# Patient Record
Sex: FEMALE | Race: BLACK OR AFRICAN AMERICAN | NOT HISPANIC OR LATINO | Employment: FULL TIME | URBAN - METROPOLITAN AREA
[De-identification: names, ages, dates, MRNs, and addresses within clinical notes are randomized per-mention and may not be internally consistent; named-entity substitution may affect disease eponyms.]

---

## 2018-04-17 ENCOUNTER — APPOINTMENT (EMERGENCY)
Dept: RADIOLOGY | Facility: HOSPITAL | Age: 43
End: 2018-04-17

## 2018-04-17 ENCOUNTER — HOSPITAL ENCOUNTER (EMERGENCY)
Facility: HOSPITAL | Age: 43
Discharge: HOME | End: 2018-04-17
Attending: EMERGENCY MEDICINE | Admitting: EMERGENCY MEDICINE

## 2018-04-17 VITALS
DIASTOLIC BLOOD PRESSURE: 73 MMHG | WEIGHT: 179.68 LBS | SYSTOLIC BLOOD PRESSURE: 137 MMHG | HEART RATE: 71 BPM | OXYGEN SATURATION: 99 % | TEMPERATURE: 98.3 F | RESPIRATION RATE: 16 BRPM

## 2018-04-17 DIAGNOSIS — J40 BRONCHITIS: Primary | ICD-10-CM

## 2018-04-17 PROCEDURE — 71046 X-RAY EXAM CHEST 2 VIEWS: CPT

## 2018-04-17 PROCEDURE — 99283 EMERGENCY DEPT VISIT LOW MDM: CPT

## 2018-04-17 RX ORDER — ALBUTEROL SULFATE 90 UG/1
2 AEROSOL, METERED RESPIRATORY (INHALATION) EVERY 4 HOURS PRN
Qty: 1 INHALER | Refills: 0 | Status: SHIPPED | OUTPATIENT
Start: 2018-04-17

## 2018-04-17 RX ORDER — BENZONATATE 100 MG/1
100 CAPSULE ORAL EVERY 8 HOURS
Qty: 21 CAPSULE | Refills: 0 | Status: SHIPPED | OUTPATIENT
Start: 2018-04-17 | End: 2020-11-19

## 2018-04-18 NOTE — DISCHARGE INSTRUCTIONS
Use albuterol inhaler 2 puffs every 4-6 hours as needed for wheezing and shortness of breath for the next 2 days, then as needed afterwards  Use Tessalon Perles as needed for cough  Use cepacol throat drops as needed for sore throat  Follow-up with her primary care doctor in 5-7 days for re-evaluation  Return for fevers, chills, chest pain, shortness of breath, or any other concerns  Acute Bronchitis   WHAT YOU NEED TO KNOW:   Acute bronchitis is swelling and irritation in the air passages of your lungs  This irritation may cause you to cough or have other breathing problems  Acute bronchitis often starts because of another illness, such as a cold or the flu  The illness spreads from your nose and throat to your windpipe and airways  Bronchitis is often called a chest cold  Acute bronchitis lasts about 3 to 6 weeks and is usually not a serious illness  Your cough can last for several weeks  DISCHARGE INSTRUCTIONS:   Return to the emergency department if:   · You cough up blood  · Your lips or fingernails turn blue  · You feel like you are not getting enough air when you breathe  Contact your healthcare provider if:   · You have a fever  · Your breathing problems do not go away or get worse  · Your cough does not get better within 4 weeks  · You have questions or concerns about your condition or care  Self-care:   · Get more rest   Rest helps your body to heal  Slowly start to do more each day  Rest when you feel it is needed  · Avoid irritants in the air  Avoid chemicals, fumes, and dust  Wear a face mask if you must work around dust or fumes  Stay inside on days when air pollution levels are high  If you have allergies, stay inside when pollen counts are high  Do not use aerosol products, such as spray-on deodorant, bug spray, and hair spray  · Do not smoke or be around others who smoke    Nicotine and other chemicals in cigarettes and cigars damages the cilia that move mucus out of your lungs  Ask your healthcare provider for information if you currently smoke and need help to quit  E-cigarettes or smokeless tobacco still contain nicotine  Talk to your healthcare provider before you use these products  · Drink liquids as directed  Liquids help keep your air passages moist and help you cough up mucus  You may need to drink more liquids when you have acute bronchitis  Ask how much liquid to drink each day and which liquids are best for you  · Use a humidifier or vaporizer  Use a cool mist humidifier or a vaporizer to increase air moisture in your home  This may make it easier for you to breathe and help decrease your cough  Decrease risk for acute bronchitis:   · Get the vaccinations you need  Ask your healthcare provider if you should get vaccinated against the flu or pneumonia  · Prevent the spread of germs  You can decrease your risk of acute bronchitis and other illnesses by doing the following:     [facility] [place] [facility] your hands often with soap and water  Carry germ-killing hand lotion or gel with you  You can use the lotion or gel to clean your hands when soap and water are not available  ¨ Do not touch your eyes, nose, or mouth unless you have washed your hands first     ¨ Always cover your mouth when you cough to prevent the spread of germs  It is best to cough into a tissue or your shirt sleeve instead of into your hand  Ask those around you cover their mouths when they cough  ¨ Try to avoid people who have a cold or the flu  If you are sick, stay away from others as much as possible  Medicines: Your healthcare provider may  give you any of the following:  · Ibuprofen or acetaminophen  are medicines that help lower your fever  They are available without a doctor's order  Ask your healthcare provider which medicine is right for you  Ask how much to take and how often to take it  Follow directions  These medicines can cause stomach bleeding if not taken correctly  Ibuprofen can cause kidney damage  Do not take ibuprofen if you have kidney disease, an ulcer, or allergies to aspirin  Acetaminophen can cause liver damage  Do not take more than 4,000 milligrams in 24 hours  · Decongestants  help loosen mucus in your lungs and make it easier to cough up  This can help you breathe easier  · Cough suppressants  decrease your urge to cough  If your cough produces mucus, do not take a cough suppressant unless your healthcare provider tells you to  Your healthcare provider may suggest that you take a cough suppressant at night so you can rest     · Inhalers  may be given  Your healthcare provider may give you one or more inhalers to help you breathe easier and cough less  An inhaler gives your medicine to open your airways  Ask your healthcare provider to show you how to use your inhaler correctly  · Take your medicine as directed  Contact your healthcare provider if you think your medicine is not helping or if you have side effects  Tell him of her if you are allergic to any medicine  Keep a list of the medicines, vitamins, and herbs you take  Include the amounts, and when and why you take them  Bring the list or the pill bottles to follow-up visits  Carry your medicine list with you in case of an emergency  Follow up with your healthcare provider as directed:  Write down questions you have so you will remember to ask them during your follow-up visits  © 2017 [address] [employer] Information is for End User's use only and may not be sold, redistributed or otherwise used for commercial purposes  All illustrations and images included in CareNotes® are the copyrighted property of [employer]  or [employer]  The above information is an  only  It is not intended as medical advice for individual conditions or treatments   Talk to your doctor, nurse or pharmacist before following any medical regimen to see if it is safe and effective for you

## 2018-04-18 NOTE — ED PROVIDER NOTES
History  Chief Complaint   Patient presents with    Cough     Pt c/o dry cough x2 weeks  denies any other symptoms     20-year-old female with no significant past medical history, who presents to the emergency department for dry cough for the past 2 weeks  Patient states that the cough has gradually gotten better, but that she has been coughing so profusely that she has developed sore throat  Also endorses a subjective fever  Denies any nasal congestion, rhinorrhea, ear pain, chest pain, shortness breath, palpitations, leg pain or swelling  Patient has attempted to use over-the-counter medications for symptomatic relief, with minimal relief attained  Has not taken any Tylenol or Motrin prior to arrival today  History provided by:  Patient   used: No    Cough   Associated symptoms: fever (Subjective) and sore throat    Associated symptoms: no chest pain, no chills, no ear pain, no headaches, no myalgias, no rash, no rhinorrhea, no shortness of breath and no wheezing        None       History reviewed  No pertinent past medical history  Past Surgical History:   Procedure Laterality Date    TUBAL LIGATION         History reviewed  No pertinent family history  I have reviewed and agree with the history as documented  Social History   Substance Use Topics    Smoking status: Never Smoker    Smokeless tobacco: Not on file    Alcohol use No        Review of Systems   Constitutional: Positive for fever (Subjective)  Negative for chills  HENT: Positive for sore throat  Negative for congestion, ear pain, rhinorrhea and trouble swallowing  Eyes: Negative  Respiratory: Positive for cough  Negative for chest tightness, shortness of breath and wheezing  Cardiovascular: Negative for chest pain, palpitations and leg swelling  Gastrointestinal: Negative for abdominal pain, constipation, diarrhea, nausea and vomiting     Genitourinary: Negative for dysuria, flank pain, frequency, hematuria and urgency  Musculoskeletal: Negative for arthralgias, back pain, gait problem, joint swelling, myalgias, neck pain and neck stiffness  Skin: Negative for color change, pallor, rash and wound  Neurological: Negative for dizziness, syncope, weakness, light-headedness, numbness and headaches  Psychiatric/Behavioral: Negative  Physical Exam  ED Triage Vitals [04/17/18 2126]   Temperature Pulse Respirations Blood Pressure SpO2   98 3 °F (36 8 °C) 71 16 137/73 99 %      Temp Source Heart Rate Source Patient Position - Orthostatic VS BP Location FiO2 (%)   Oral Monitor Sitting Right arm --      Pain Score       --           Orthostatic Vital Signs  Vitals:    04/17/18 2126   BP: 137/73   Pulse: 71   Patient Position - Orthostatic VS: Sitting       Physical Exam   Constitutional: She is oriented to person, place, and time  She appears well-developed and well-nourished  HENT:   Head: Normocephalic and atraumatic  Mouth/Throat: Oropharynx is clear and moist    Bilateral Tms are non-bulging and without erythema  Posterior oropharynx without erythema or edema  No tonsilar hypertrophy  Uvula is midline and non-edematous  Eyes: Conjunctivae and EOM are normal  Pupils are equal, round, and reactive to light  Neck: Normal range of motion  Neck supple  Cardiovascular: Normal rate, regular rhythm and intact distal pulses  Pulmonary/Chest: Effort normal and breath sounds normal  She has no wheezes  She has no rales  Abdominal: Soft  Bowel sounds are normal  She exhibits no distension  There is no tenderness  There is no rebound and no guarding  Musculoskeletal: Normal range of motion  She exhibits no edema or tenderness  Lymphadenopathy:     She has cervical adenopathy  Neurological: She is alert and oriented to person, place, and time  No cranial nerve deficit or sensory deficit  She exhibits normal muscle tone  Coordination normal    Skin: Skin is warm and dry   Capillary refill takes less than 2 seconds  Psychiatric: She has a normal mood and affect  Her behavior is normal    Nursing note and vitals reviewed  ED Medications  Medications - No data to display    Diagnostic Studies  Results Reviewed     None                 XR chest 2 views   ED Interpretation by Gera Gilliland PA-C (04/17 2218)   No acute cardiopulmonary disease  Procedures  Procedures       Phone Contacts  ED Phone Contact    ED Course  ED Course                                MDM  Number of Diagnoses or Management Options  Diagnosis management comments: 30-year-old female with no significant past medical history, who presents to the emergency department for dry cough for the past 2 weeks  Differential Diagnosis includes but is not limited to:  Viral illness, viral bronchitis, viral upper respiratory infection, influenza, pneumonia  CXR obtain as the patient has been experiencing these symptoms for the past 2 weeks  It is negative for any cardiopulmonary disease  Patient likely has bronchitis  Patient has been re-educated on the importance of fever control and oral hydration during this time  Instructed to follow up with primary care doctor in 3-5 days  Amount and/or Complexity of Data Reviewed  Tests in the radiology section of CPT®: ordered and reviewed      CritCare Time    Disposition  Final diagnoses:   Bronchitis     Time reflects when diagnosis was documented in both MDM as applicable and the Disposition within this note     Time User Action Codes Description Comment    4/17/2018 10:22 PM Myrtha Mcburney Add [J40] Bronchitis       ED Disposition     ED Disposition Condition Comment    Discharge  Carlee Taylor discharge to home/self care  Condition at discharge: Good        Follow-up Information     Follow up With Specialties Details Why [address], DO Internal Medicine In 5 days As needed [address] [facility]    [address] [address]  [phone]          Patient's Medications   Discharge Prescriptions    ALBUTEROL (PROVENTIL HFA,VENTOLIN HFA) 90 MCG/ACT INHALER    Inhale 2 puffs every 4 (four) hours as needed for wheezing or shortness of breath       Start Date: 4/17/2018 End Date: --       Order Dose: 2 puffs       Quantity: 1 Inhaler    Refills: 0    BENZONATATE (TESSALON PERLES) 100 MG CAPSULE    Take 1 capsule (100 mg total) by mouth every 8 (eight) hours       Start Date: 4/17/2018 End Date: --       Order Dose: 100 mg       Quantity: 21 capsule    Refills: 0    MENTHOL-CETYLPYRIDINIUM (CEPACOL) 3 MG LOZENGE    Take 1 lozenge (3 mg total) by mouth as needed for sore throat       Start Date: 4/17/2018 End Date: --       Order Dose: 3 mg       Quantity: 9 tablet    Refills: 0     No discharge procedures on file      ED Provider  Electronically Signed by           Nga Cash PA-C  04/17/18 2354

## 2020-10-30 ENCOUNTER — HOSPITAL ENCOUNTER (EMERGENCY)
Facility: HOSPITAL | Age: 45
Discharge: HOME | End: 2020-10-30
Attending: EMERGENCY MEDICINE | Admitting: EMERGENCY MEDICINE
Payer: COMMERCIAL

## 2020-10-30 VITALS
RESPIRATION RATE: 18 BRPM | TEMPERATURE: 98.6 F | OXYGEN SATURATION: 96 % | WEIGHT: 187 LBS | DIASTOLIC BLOOD PRESSURE: 89 MMHG | HEART RATE: 67 BPM | SYSTOLIC BLOOD PRESSURE: 166 MMHG

## 2020-10-30 DIAGNOSIS — R59.0 PREAURICULAR LYMPHADENOPATHY: Primary | ICD-10-CM

## 2020-10-30 PROCEDURE — 99282 EMERGENCY DEPT VISIT SF MDM: CPT

## 2020-10-30 PROCEDURE — 99284 EMERGENCY DEPT VISIT MOD MDM: CPT | Performed by: PHYSICIAN ASSISTANT

## 2020-10-30 RX ORDER — NAPROXEN 500 MG/1
500 TABLET ORAL 2 TIMES DAILY WITH MEALS
Qty: 30 TABLET | Refills: 0 | Status: SHIPPED | OUTPATIENT
Start: 2020-10-30

## 2020-11-18 ENCOUNTER — HOSPITAL ENCOUNTER (EMERGENCY)
Facility: HOSPITAL | Age: 45
Discharge: HOME | End: 2020-11-18
Attending: EMERGENCY MEDICINE | Admitting: EMERGENCY MEDICINE
Payer: COMMERCIAL

## 2020-11-18 ENCOUNTER — APPOINTMENT (EMERGENCY)
Dept: CT IMAGING | Facility: HOSPITAL | Age: 45
End: 2020-11-18
Payer: COMMERCIAL

## 2020-11-18 VITALS
SYSTOLIC BLOOD PRESSURE: 150 MMHG | DIASTOLIC BLOOD PRESSURE: 80 MMHG | WEIGHT: 200 LBS | HEIGHT: 72 IN | RESPIRATION RATE: 20 BRPM | HEART RATE: 65 BPM | TEMPERATURE: 98.4 F | BODY MASS INDEX: 27.09 KG/M2 | OXYGEN SATURATION: 99 %

## 2020-11-18 DIAGNOSIS — K11.20 PAROTITIS: Primary | ICD-10-CM

## 2020-11-18 LAB
ALBUMIN SERPL BCP-MCNC: 3.3 G/DL (ref 3.5–5)
ALP SERPL-CCNC: 75 U/L (ref 46–116)
ALT SERPL W P-5'-P-CCNC: 24 U/L (ref 12–78)
ANION GAP SERPL CALCULATED.3IONS-SCNC: 7 MMOL/L (ref 4–13)
AST SERPL W P-5'-P-CCNC: 16 U/L (ref 5–45)
BASOPHILS # BLD AUTO: 0.05 THOUSANDS/ΜL (ref 0–0.1)
BASOPHILS NFR BLD AUTO: 1 % (ref 0–1)
BILIRUB SERPL-MCNC: 0.35 MG/DL (ref 0.2–1)
BUN SERPL-MCNC: 14 MG/DL (ref 5–25)
CALCIUM ALBUM COR SERPL-MCNC: 9.4 MG/DL (ref 8.3–10.1)
CALCIUM SERPL-MCNC: 8.8 MG/DL (ref 8.3–10.1)
CHLORIDE SERPL-SCNC: 106 MMOL/L (ref 100–108)
CO2 SERPL-SCNC: 26 MMOL/L (ref 21–32)
CREAT SERPL-MCNC: 0.73 MG/DL (ref 0.6–1.3)
EOSINOPHIL # BLD AUTO: 0.3 THOUSAND/ΜL (ref 0–0.61)
EOSINOPHIL NFR BLD AUTO: 5 % (ref 0–6)
ERYTHROCYTE [DISTWIDTH] IN BLOOD BY AUTOMATED COUNT: 12.9 % (ref 11.6–15.1)
GFR SERPL CREATININE-BSD FRML MDRD: 116 ML/MIN/1.73SQ M
GLUCOSE SERPL-MCNC: 82 MG/DL (ref 65–140)
HCT VFR BLD AUTO: 39.5 % (ref 34.8–46.1)
HGB BLD-MCNC: 12.6 G/DL (ref 11.5–15.4)
IMM GRANULOCYTES # BLD AUTO: 0.01 THOUSAND/UL (ref 0–0.2)
IMM GRANULOCYTES NFR BLD AUTO: 0 % (ref 0–2)
LYMPHOCYTES # BLD AUTO: 1.87 THOUSANDS/ΜL (ref 0.6–4.47)
LYMPHOCYTES NFR BLD AUTO: 33 % (ref 14–44)
MCH RBC QN AUTO: 29.6 PG (ref 26.8–34.3)
MCHC RBC AUTO-ENTMCNC: 31.9 G/DL (ref 31.4–37.4)
MCV RBC AUTO: 93 FL (ref 82–98)
MONOCYTES # BLD AUTO: 0.54 THOUSAND/ΜL (ref 0.17–1.22)
MONOCYTES NFR BLD AUTO: 10 % (ref 4–12)
NEUTROPHILS # BLD AUTO: 2.86 THOUSANDS/ΜL (ref 1.85–7.62)
NEUTS SEG NFR BLD AUTO: 51 % (ref 43–75)
NRBC BLD AUTO-RTO: 0 /100 WBCS
PLATELET # BLD AUTO: 236 THOUSANDS/UL (ref 149–390)
PMV BLD AUTO: 10.5 FL (ref 8.9–12.7)
POTASSIUM SERPL-SCNC: 4.2 MMOL/L (ref 3.5–5.3)
PROT SERPL-MCNC: 6.5 G/DL (ref 6.4–8.2)
RBC # BLD AUTO: 4.25 MILLION/UL (ref 3.81–5.12)
SODIUM SERPL-SCNC: 139 MMOL/L (ref 136–145)
WBC # BLD AUTO: 5.63 THOUSAND/UL (ref 4.31–10.16)

## 2020-11-18 PROCEDURE — G1004 CDSM NDSC: HCPCS

## 2020-11-18 PROCEDURE — 99284 EMERGENCY DEPT VISIT MOD MDM: CPT | Performed by: PHYSICIAN ASSISTANT

## 2020-11-18 PROCEDURE — 70487 CT MAXILLOFACIAL W/DYE: CPT

## 2020-11-18 PROCEDURE — 85025 COMPLETE CBC W/AUTO DIFF WBC: CPT | Performed by: PHYSICIAN ASSISTANT

## 2020-11-18 PROCEDURE — 80053 COMPREHEN METABOLIC PANEL: CPT | Performed by: PHYSICIAN ASSISTANT

## 2020-11-18 PROCEDURE — 96375 TX/PRO/DX INJ NEW DRUG ADDON: CPT

## 2020-11-18 PROCEDURE — 99284 EMERGENCY DEPT VISIT MOD MDM: CPT

## 2020-11-18 PROCEDURE — 96374 THER/PROPH/DIAG INJ IV PUSH: CPT

## 2020-11-18 PROCEDURE — 36415 COLL VENOUS BLD VENIPUNCTURE: CPT | Performed by: PHYSICIAN ASSISTANT

## 2020-11-18 RX ORDER — DEXAMETHASONE SODIUM PHOSPHATE 4 MG/ML
10 INJECTION, SOLUTION INTRA-ARTICULAR; INTRALESIONAL; INTRAMUSCULAR; INTRAVENOUS; SOFT TISSUE ONCE
Status: COMPLETED | OUTPATIENT
Start: 2020-11-18 | End: 2020-11-18

## 2020-11-18 RX ORDER — KETOROLAC TROMETHAMINE 30 MG/ML
15 INJECTION, SOLUTION INTRAMUSCULAR; INTRAVENOUS ONCE
Status: COMPLETED | OUTPATIENT
Start: 2020-11-18 | End: 2020-11-18

## 2020-11-18 RX ORDER — IBUPROFEN 600 MG/1
600 TABLET ORAL EVERY 6 HOURS PRN
Qty: 20 TABLET | Refills: 0 | Status: SHIPPED | OUTPATIENT
Start: 2020-11-18 | End: 2020-11-19 | Stop reason: SDUPTHER

## 2020-11-18 RX ADMIN — DEXAMETHASONE SODIUM PHOSPHATE 10 MG: 4 INJECTION, SOLUTION INTRAMUSCULAR; INTRAVENOUS at 13:51

## 2020-11-18 RX ADMIN — IOHEXOL 85 ML: 350 INJECTION, SOLUTION INTRAVENOUS at 12:48

## 2020-11-18 RX ADMIN — KETOROLAC TROMETHAMINE 15 MG: 30 INJECTION, SOLUTION INTRAMUSCULAR at 11:28

## 2020-11-19 ENCOUNTER — TRANSCRIBE ORDERS (OUTPATIENT)
Dept: ADMINISTRATIVE | Facility: HOSPITAL | Age: 45
End: 2020-11-19

## 2020-11-20 ENCOUNTER — TRANSCRIBE ORDERS (OUTPATIENT)
Dept: ADMINISTRATIVE | Facility: HOSPITAL | Age: 45
End: 2020-11-20

## 2020-11-20 DIAGNOSIS — K11.8 MASS OF LEFT PAROTID GLAND: Primary | ICD-10-CM

## 2020-11-23 ENCOUNTER — HOSPITAL ENCOUNTER (OUTPATIENT)
Dept: RADIOLOGY | Facility: HOSPITAL | Age: 45
Discharge: HOME | End: 2020-11-23
Attending: OTOLARYNGOLOGY

## 2020-12-07 ENCOUNTER — HOSPITAL ENCOUNTER (OUTPATIENT)
Dept: RADIOLOGY | Facility: HOSPITAL | Age: 45
Discharge: HOME | End: 2020-12-07
Attending: OTOLARYNGOLOGY | Admitting: RADIOLOGY
Payer: COMMERCIAL

## 2020-12-07 DIAGNOSIS — K11.8 MASS OF LEFT PAROTID GLAND: ICD-10-CM

## 2020-12-07 PROCEDURE — 88172 CYTP DX EVAL FNA 1ST EA SITE: CPT | Performed by: PATHOLOGY

## 2020-12-07 PROCEDURE — 88173 CYTOPATH EVAL FNA REPORT: CPT | Performed by: PATHOLOGY

## 2020-12-07 PROCEDURE — 10005 FNA BX W/US GDN 1ST LES: CPT

## 2020-12-07 PROCEDURE — 88305 TISSUE EXAM BY PATHOLOGIST: CPT | Performed by: PATHOLOGY

## 2020-12-07 PROCEDURE — 88333 PATH CONSLTJ SURG CYTO XM 1: CPT | Performed by: PATHOLOGY

## 2020-12-07 RX ORDER — LIDOCAINE HYDROCHLORIDE 10 MG/ML
2 INJECTION, SOLUTION EPIDURAL; INFILTRATION; INTRACAUDAL; PERINEURAL ONCE
Status: COMPLETED | OUTPATIENT
Start: 2020-12-07 | End: 2020-12-07

## 2020-12-07 RX ADMIN — LIDOCAINE HYDROCHLORIDE 2 ML: 10 INJECTION, SOLUTION EPIDURAL; INFILTRATION; INTRACAUDAL; PERINEURAL at 09:33

## 2021-04-05 DIAGNOSIS — Z23 ENCOUNTER FOR IMMUNIZATION: ICD-10-CM

## 2021-04-12 ENCOUNTER — IMMUNIZATIONS (OUTPATIENT)
Dept: FAMILY MEDICINE CLINIC | Facility: HOSPITAL | Age: 46
End: 2021-04-12

## 2021-04-12 DIAGNOSIS — Z23 ENCOUNTER FOR IMMUNIZATION: Primary | ICD-10-CM

## 2021-04-12 PROCEDURE — 91300 SARS-COV-2 / COVID-19 MRNA VACCINE (PFIZER-BIONTECH) 30 MCG: CPT

## 2021-04-12 PROCEDURE — 0001A SARS-COV-2 / COVID-19 MRNA VACCINE (PFIZER-BIONTECH) 30 MCG: CPT

## 2021-05-06 ENCOUNTER — IMMUNIZATIONS (OUTPATIENT)
Dept: FAMILY MEDICINE CLINIC | Facility: HOSPITAL | Age: 46
End: 2021-05-06

## 2021-05-06 DIAGNOSIS — Z23 ENCOUNTER FOR IMMUNIZATION: Primary | ICD-10-CM

## 2021-05-06 PROCEDURE — 91300 SARS-COV-2 / COVID-19 MRNA VACCINE (PFIZER-BIONTECH) 30 MCG: CPT

## 2021-05-06 PROCEDURE — 0002A SARS-COV-2 / COVID-19 MRNA VACCINE (PFIZER-BIONTECH) 30 MCG: CPT

## 2021-09-04 ENCOUNTER — HOSPITAL ENCOUNTER (EMERGENCY)
Facility: HOSPITAL | Age: 46
Discharge: HOME | End: 2021-09-04
Attending: EMERGENCY MEDICINE
Payer: COMMERCIAL

## 2021-09-04 VITALS
SYSTOLIC BLOOD PRESSURE: 138 MMHG | HEIGHT: 70 IN | HEART RATE: 73 BPM | RESPIRATION RATE: 18 BRPM | WEIGHT: 215 LBS | BODY MASS INDEX: 30.78 KG/M2 | OXYGEN SATURATION: 97 % | TEMPERATURE: 98.6 F | DIASTOLIC BLOOD PRESSURE: 76 MMHG

## 2021-09-04 DIAGNOSIS — Z11.52 ENCOUNTER FOR SCREENING FOR COVID-19: ICD-10-CM

## 2021-09-04 DIAGNOSIS — R09.81 HEAD CONGESTION: Primary | ICD-10-CM

## 2021-09-04 LAB — SARS-COV-2 RNA RESP QL NAA+PROBE: NEGATIVE

## 2021-09-04 PROCEDURE — U0003 INFECTIOUS AGENT DETECTION BY NUCLEIC ACID (DNA OR RNA); SEVERE ACUTE RESPIRATORY SYNDROME CORONAVIRUS 2 (SARS-COV-2) (CORONAVIRUS DISEASE [COVID-19]), AMPLIFIED PROBE TECHNIQUE, MAKING USE OF HIGH THROUGHPUT TECHNOLOGIES AS DESCRIBED BY CMS-2020-01-R: HCPCS

## 2021-09-04 PROCEDURE — 99284 EMERGENCY DEPT VISIT MOD MDM: CPT | Performed by: EMERGENCY MEDICINE

## 2021-09-04 PROCEDURE — 99283 EMERGENCY DEPT VISIT LOW MDM: CPT

## 2021-09-04 PROCEDURE — U0005 INFEC AGEN DETEC AMPLI PROBE: HCPCS

## 2021-09-04 NOTE — ED PROVIDER NOTES
History  Chief Complaint   Patient presents with    Cold Like Symptoms     Pt c/o HA and nasal congestion, states she just found out she had a covid + exposure last Friday  67-year-old female with no significant past medical history presenting to the emergency department after COVID exposure on Friday  Patient states that she was talking to somebody who recently tested positive without a mask on  I would like to be evaluated patient states that over past 2 days she has had upper respiratory symptoms which consisted of headache as well as a runny nose  Patient denies any fevers, chills, chest pain, shortness breath, nausea, vomiting, diarrhea, constipation, sore throat, sore ears, loss of taste or smell  Patient does not want any medications for her headache but would like to be evaluated for COVID  Patient is COVID vaccinated  Prior to Admission Medications   Prescriptions Last Dose Informant Patient Reported? Taking? albuterol (PROVENTIL HFA,VENTOLIN HFA) 90 mcg/act inhaler   No No   Sig: Inhale 2 puffs every 4 (four) hours as needed for wheezing or shortness of breath   naproxen (NAPROSYN) 500 mg tablet   No No   Sig: Take 1 tablet (500 mg total) by mouth 2 (two) times a day with meals      Facility-Administered Medications: None       History reviewed  No pertinent past medical history  Past Surgical History:   Procedure Laterality Date    TUBAL LIGATION      US GUIDED LYMPH NODE BIOPSY LEFT  12/7/2020       History reviewed  No pertinent family history  I have reviewed and agree with the history as documented  E-Cigarette/Vaping     E-Cigarette/Vaping Substances     Social History     Tobacco Use    Smoking status: Never Smoker    Smokeless tobacco: Never Used   Substance Use Topics    Alcohol use: No    Drug use: No        Review of Systems   Constitutional: Negative for activity change, appetite change, chills, fatigue and fever     HENT: Positive for congestion, postnasal drip and sinus pressure  Negative for ear pain and sore throat  Respiratory: Negative for chest tightness and shortness of breath  Cardiovascular: Negative for chest pain  Gastrointestinal: Negative for abdominal pain, constipation, diarrhea, nausea and vomiting  Genitourinary: Negative for difficulty urinating  Musculoskeletal: Negative for arthralgias and myalgias  Neurological: Positive for headaches  Negative for light-headedness  Psychiatric/Behavioral: Negative for agitation and behavioral problems  All other systems reviewed and are negative  Physical Exam  ED Triage Vitals [09/04/21 1621]   Temperature Pulse Respirations Blood Pressure SpO2   98 6 °F (37 °C) 73 18 138/76 97 %      Temp Source Heart Rate Source Patient Position - Orthostatic VS BP Location FiO2 (%)   Oral Monitor Sitting Right arm --      Pain Score       --             Orthostatic Vital Signs  Vitals:    09/04/21 1621   BP: 138/76   Pulse: 73   Patient Position - Orthostatic VS: Sitting       Physical Exam  Vitals and nursing note reviewed  Constitutional:       General: She is not in acute distress  Appearance: Normal appearance  She is normal weight  She is not ill-appearing, toxic-appearing or diaphoretic  HENT:      Head: Normocephalic and atraumatic  Right Ear: Tympanic membrane, ear canal and external ear normal       Left Ear: Tympanic membrane, ear canal and external ear normal       Nose: Rhinorrhea present  Mouth/Throat:      Mouth: Mucous membranes are moist       Pharynx: No oropharyngeal exudate or posterior oropharyngeal erythema  Eyes:      Extraocular Movements: Extraocular movements intact  Conjunctiva/sclera: Conjunctivae normal    Cardiovascular:      Rate and Rhythm: Normal rate and regular rhythm  Pulses: Normal pulses  Heart sounds: Normal heart sounds  Pulmonary:      Effort: Pulmonary effort is normal       Breath sounds: Normal breath sounds     Abdominal: General: Abdomen is flat  Bowel sounds are normal       Palpations: Abdomen is soft  Musculoskeletal:         General: Normal range of motion  Cervical back: Normal range of motion  Skin:     General: Skin is warm  Capillary Refill: Capillary refill takes less than 2 seconds  Neurological:      General: No focal deficit present  Mental Status: She is alert  Psychiatric:         Mood and Affect: Mood normal          Behavior: Behavior normal          ED Medications  Medications - No data to display    Diagnostic Studies  Results Reviewed     Procedure Component Value Units Date/Time    [id number] Collected: 09/04/21 1736    Lab Status: In process Specimen: Nares from Nose Updated: 09/04/21 1742                 No orders to display         Procedures  Procedures      ED Course  ED Course as of Sep 04 1749   Sat Sep 04, 2021   1747 Patient screen for COVID  Patient does not want anything for her headache  Explained to patient that somebody would be in contact with her with the result of the COVID test   Advised her to self quarantine until the results  Advised her to follow-up with her primary care doctor in 2-3 days for symptoms have not resolved  Encouraged her to come back to the hospital she has any new, worsening, or concerning symptoms patient understands and is in agreeance  Patient okay for discharge has no questions at this time                                            MDM    Disposition  Final diagnoses:   Head congestion   Encounter for screening for COVID-19     Time reflects when diagnosis was documented in both MDM as applicable and the Disposition within this note     Time User Action Codes Description Comment    9/4/2021  5:41 PM Veda Saavedra [R09 81] Head congestion     9/4/2021  5:41 PM Veda Saavedra [Z11 52] Encounter for screening for COVID-19       ED Disposition     ED Disposition Condition Date/Time Comment Discharge Stable Sat Sep 4, 2021  5:40 PM Dina Post discharge to home/self care  Follow-up Information     Follow up With Specialties Details Why Contact Info Additional Roberta Monroy, DO Internal Medicine Schedule an appointment as soon as possible for a visit in 3 days for follow up [address] [facility] [address]  [facility] Emergency Department Emergency Medicine Go to  As needed, If symptoms worsen [address] [place] [address] Emergency Department, [address], [place], [address]   [phone]          Patient's Medications   Discharge Prescriptions    No medications on file     No discharge procedures on file  PDMP Review     None           ED Provider  Attending physically available and evaluated Dina Post I managed the patient along with the ED Attending      Electronically Signed by         Tree Damon DO  09/04/21 0134

## 2021-09-04 NOTE — DISCHARGE INSTRUCTIONS
Thank you for coming to the ER today  Please follow up with your primary care doctor in 1-2 days to be re-evaluated  If at any point you experience any new or worsening symptoms do not hesitate to come back to the hospital to be evaluated  Symptoms you should look out for include shortness of breath, increased fevers, or any other new, worsening or concerning symptom  Somebody should be in contact with you with the results of your COVID test   Thank you and hope you have a great rest of your day

## 2021-09-07 NOTE — ED ATTENDING ATTESTATION
9/4/2021  IFred MD, saw and evaluated the patient  I have discussed the patient with the resident/non-physician practitioner and agree with the resident's/non-physician practitioner's findings, Plan of Care, and MDM as documented in the resident's/non-physician practitioner's note, except where noted  All available labs and Radiology studies were reviewed  I was present for key portions of any procedure(s) performed by the resident/non-physician practitioner and I was immediately available to provide assistance  At this point I agree with the current assessment done in the Emergency Department  I have conducted an independent evaluation of this patient a history and physical is as follows:    ED Course     Patient presents for evaluation after a covid + exposure  She reports 2 days of HA and rhinorrhea  No additional complaints  Patient is vaccinated against covid  A/P: COVID exposure, viral syndrome   Will check COVID test      Critical Care Time  Procedures

## 2022-01-22 ENCOUNTER — IMMUNIZATIONS (OUTPATIENT)
Dept: FAMILY MEDICINE CLINIC | Facility: HOSPITAL | Age: 47
End: 2022-01-22

## 2022-01-22 DIAGNOSIS — Z23 ENCOUNTER FOR IMMUNIZATION: Primary | ICD-10-CM

## 2022-01-22 PROCEDURE — 0001A COVID-19 PFIZER VACC 0.3 ML: CPT

## 2022-01-22 PROCEDURE — 91300 COVID-19 PFIZER VACC 0.3 ML: CPT

## 2024-11-21 ENCOUNTER — HOSPITAL ENCOUNTER (EMERGENCY)
Facility: HOSPITAL | Age: 49
Discharge: HOME | End: 2024-11-21
Attending: EMERGENCY MEDICINE
Payer: COMMERCIAL

## 2024-11-21 VITALS
SYSTOLIC BLOOD PRESSURE: 158 MMHG | OXYGEN SATURATION: 100 % | TEMPERATURE: 97.5 F | DIASTOLIC BLOOD PRESSURE: 74 MMHG | HEART RATE: 70 BPM | RESPIRATION RATE: 18 BRPM

## 2024-11-21 DIAGNOSIS — M54.50 LOW BACK PAIN: Primary | ICD-10-CM

## 2024-11-21 PROCEDURE — 96372 THER/PROPH/DIAG INJ SC/IM: CPT

## 2024-11-21 PROCEDURE — 99284 EMERGENCY DEPT VISIT MOD MDM: CPT | Performed by: EMERGENCY MEDICINE

## 2024-11-21 PROCEDURE — 99282 EMERGENCY DEPT VISIT SF MDM: CPT

## 2024-11-21 RX ORDER — KETOROLAC TROMETHAMINE 30 MG/ML
15 INJECTION, SOLUTION INTRAMUSCULAR; INTRAVENOUS ONCE
Status: COMPLETED | OUTPATIENT
Start: 2024-11-21 | End: 2024-11-21

## 2024-11-21 RX ORDER — METHOCARBAMOL 500 MG/1
500 TABLET, FILM COATED ORAL 2 TIMES DAILY
Qty: 14 TABLET | Refills: 0 | Status: SHIPPED | OUTPATIENT
Start: 2024-11-21 | End: 2024-11-21

## 2024-11-21 RX ORDER — ACETAMINOPHEN 325 MG/1
650 TABLET ORAL ONCE
Status: COMPLETED | OUTPATIENT
Start: 2024-11-21 | End: 2024-11-21

## 2024-11-21 RX ORDER — METHOCARBAMOL 500 MG/1
500 TABLET, FILM COATED ORAL ONCE
Status: COMPLETED | OUTPATIENT
Start: 2024-11-21 | End: 2024-11-21

## 2024-11-21 RX ORDER — LIDOCAINE 50 MG/G
2 PATCH TOPICAL ONCE
Status: DISCONTINUED | OUTPATIENT
Start: 2024-11-21 | End: 2024-11-21 | Stop reason: HOSPADM

## 2024-11-21 RX ORDER — METHOCARBAMOL 500 MG/1
500 TABLET, FILM COATED ORAL 2 TIMES DAILY
Qty: 14 TABLET | Refills: 0 | Status: SHIPPED | OUTPATIENT
Start: 2024-11-21 | End: 2024-11-28

## 2024-11-21 RX ADMIN — ACETAMINOPHEN 650 MG: 325 TABLET, FILM COATED ORAL at 18:05

## 2024-11-21 RX ADMIN — KETOROLAC TROMETHAMINE 15 MG: 30 INJECTION, SOLUTION INTRAMUSCULAR; INTRAVENOUS at 18:53

## 2024-11-21 RX ADMIN — METHOCARBAMOL 500 MG: 500 TABLET ORAL at 18:53

## 2024-11-21 RX ADMIN — LIDOCAINE 2 PATCH: 50 PATCH TOPICAL at 18:53

## 2024-11-21 NOTE — ED PROVIDER NOTES
Time reflects when diagnosis was documented in both MDM as applicable and the Disposition within this note       Time User Action Codes Description Comment    11/21/2024  6:53 PM Mateus Burrows Add [M54.50] Low back pain           ED Disposition       ED Disposition   Discharge    Condition   Stable    Date/Time   Thu Nov 21, 2024  7:07 PM    Comment   Jill Eubanks discharge to home/self care.                   Assessment & Plan       Medical Decision Making  Treating for musculoskeletal low back pain.  At this time not concern for other causes of low back pain including epidural abscess, cauda equina, kidney stone.  Multimodal pain regimen for patient.  As well as comp spine referral.  Patient was not driving and her fiancé will pick her up.  Good return precautions noted.  Patient states that her pain slightly improved with the medication.  Patient was able to walk on her own.    Risk  OTC drugs.  Prescription drug management.             Medications   lidocaine (LIDODERM) 5 % patch 2 patch (2 patches Topical Medication Applied 11/21/24 1853)   acetaminophen (TYLENOL) tablet 650 mg (650 mg Oral Given 11/21/24 1805)   ketorolac (TORADOL) injection 15 mg (15 mg Intramuscular Given 11/21/24 1853)   methocarbamol (ROBAXIN) tablet 500 mg (500 mg Oral Given 11/21/24 1853)       ED Risk Strat Scores                                               History of Present Illness       Chief Complaint   Patient presents with    Back Pain     Pt began with R sided lower back pain radiating down his buttocks and into her hip. Denies any injury or trauma.  Noted some tingling in her lower back.  Having difficulty walking and moving.  No loss of bowel or bladder.        History reviewed. No pertinent past medical history.   Past Surgical History:   Procedure Laterality Date    TUBAL LIGATION      US GUIDED LYMPH NODE BIOPSY LEFT  12/7/2020      History reviewed. No pertinent family history.   Social History     Tobacco Use     Smoking status: Never    Smokeless tobacco: Never   Substance Use Topics    Alcohol use: No    Drug use: No      E-Cigarette/Vaping      E-Cigarette/Vaping Substances      I have reviewed and agree with the history as documented.     Patient is a 49-year-old female who states for the past 2 days has had worsening right-sided low back and buttock pain.  Denies any fall or trauma.  Patient denies any fever, perineal numbness, loss of bowel or bladder, IV drug use.  Patient states the pain is worsened with any sort of movement including walking.  Patient is able to ambulate however she states that she was slower than normal.  Denies any numbness or tingling shooting down the back of the leg.      Back Pain  Associated symptoms: no abdominal pain, no chest pain, no dysuria and no fever        Review of Systems   Constitutional:  Negative for fever.   Respiratory:  Negative for cough and shortness of breath.    Cardiovascular:  Negative for chest pain and palpitations.   Gastrointestinal:  Negative for abdominal pain, nausea and vomiting.   Genitourinary:  Negative for dysuria and hematuria.   Musculoskeletal:  Positive for back pain.   Skin:  Negative for rash.   Neurological:  Negative for syncope.   All other systems reviewed and are negative.          Objective       ED Triage Vitals [11/21/24 1801]   Temperature Pulse Blood Pressure Respirations SpO2 Patient Position - Orthostatic VS   97.5 °F (36.4 °C) 70 158/74 18 100 % --      Temp Source Heart Rate Source BP Location FiO2 (%) Pain Score    Temporal -- -- -- 10 - Worst Possible Pain      Vitals      Date and Time Temp Pulse SpO2 Resp BP Pain Score FACES Pain Rating User   11/21/24 1805 -- -- -- -- -- 10 - Worst Possible Pain -- BMM   11/21/24 1801 97.5 °F (36.4 °C) 70 100 % 18 158/74 10 - Worst Possible Pain -- BMM            Physical Exam  Vitals and nursing note reviewed.   Constitutional:       General: She is not in acute distress.     Appearance: She is  well-developed.   HENT:      Head: Normocephalic and atraumatic.   Cardiovascular:      Rate and Rhythm: Normal rate and regular rhythm.   Pulmonary:      Effort: Pulmonary effort is normal. No respiratory distress.      Breath sounds: Normal breath sounds.   Abdominal:      Palpations: Abdomen is soft.      Tenderness: There is no abdominal tenderness.   Musculoskeletal:         General: Tenderness present. No swelling, deformity or signs of injury.      Cervical back: Neck supple.      Right lower leg: No edema.      Left lower leg: No edema.   Skin:     Capillary Refill: Capillary refill takes less than 2 seconds.   Neurological:      Mental Status: She is alert.      Sensory: No sensory deficit.      Motor: No weakness.   Psychiatric:         Mood and Affect: Mood normal.         Results Reviewed       None            No orders to display       Procedures    ED Medication and Procedure Management   Prior to Admission Medications   Prescriptions Last Dose Informant Patient Reported? Taking?   albuterol (PROVENTIL HFA,VENTOLIN HFA) 90 mcg/act inhaler   No No   Sig: Inhale 2 puffs every 4 (four) hours as needed for wheezing or shortness of breath   naproxen (NAPROSYN) 500 mg tablet   No No   Sig: Take 1 tablet (500 mg total) by mouth 2 (two) times a day with meals      Facility-Administered Medications: None     Patient's Medications   Discharge Prescriptions    METHOCARBAMOL (ROBAXIN) 500 MG TABLET    Take 1 tablet (500 mg total) by mouth 2 (two) times a day for 7 days       Start Date: 11/21/2024End Date: 11/28/2024       Order Dose: 500 mg       Quantity: 14 tablet    Refills: 0       ED SEPSIS DOCUMENTATION   Time reflects when diagnosis was documented in both MDM as applicable and the Disposition within this note       Time User Action Codes Description Comment    11/21/2024  6:53 PM Mateus Burrows Add [M54.50] Low back pain                  Mateus Burrows DO  11/21/24 1915

## 2024-11-21 NOTE — ED ATTENDING ATTESTATION
11/21/2024  I, Enzo Conde DO, saw and evaluated the patient. I have discussed the patient with the resident/non-physician practitioner and agree with the resident's/non-physician practitioner's findings, Plan of Care, and MDM as documented in the resident's/non-physician practitioner's note, except where noted. All available labs and Radiology studies were reviewed.  I was present for key portions of any procedure(s) performed by the resident/non-physician practitioner and I was immediately available to provide assistance.       At this point I agree with the current assessment done in the Emergency Department.  I have conducted an independent evaluation of this patient a history and physical is as follows:    Patient is a healthy 49-year-old female, says for the last several days she has had some mild discomfort in her right sided low back, worse when he twists or bends or moves.  No fall, no trauma, does not remember any particular inciting incident.  She has been taking occasionally bareback and body over-the-counter medication which is been somewhat helpful at times.  Also using some ice and heat which is also somewhat helpful.  She presents today because after sitting for several hours at work she went to stand up and felt her back was feeling much worse.  There has been no fall, no trauma, no fever, no chills, no bladder or bowel incontinence, no saddle anesthesia, no leg weakness, no IV drug use, no history of cancer.  No dysuria or hematuria.    General:  Patient is well-appearing  Head:  Atraumatic  Eyes:  Conjunctiva pink  ENT:  Mucous membranes are moist  Neck:  Supple  Cardiac:  S1-S2, without murmurs  Lungs:  Clear to auscultation bilaterally  Abdomen:  Soft, nontender, normal bowel sounds, no CVA tenderness  Extremities:  Normal range of motion   Back: No warmth or redness to the back.  There is no CVA tenderness, no spinal tenderness, no warmth or fluctuance.  She does have some mild  right-sided paraspinal muscular hypertonicity  Neurologic:  Awake, fluent speech, normal comprehension, sensation intact and symmetric in the b/l  legs. Strength is 5/5 at the bilateral hips, knees, ankles, reflexes are 2/4 at the bilateral knees and ankles.  Can dorsiflex & plantarflex great toes bilaterally without difficulty, no saddle anesthesia, negative straight leg raise bilaterally. AAOx3  Skin:  Pink warm and dry, no rash  Psychiatric:  Alert, pleasant, cooperative      ED Course     Based on the patient's history and physical exam findings, I do not find any evidence of neurosurgical emergency or need for emergent imaging studies as there is no bladder or bowel incontinence, no saddle anesthesia, and no significant neurologic abnormalities. There is no evidence of cauda equina syndrome, The patient is afebrile, has no significant vertebral tenderness or fluctuance, and has no IV drug use, significant trauma, or recent spinal injections. I believe it is appropriate for the patient to be discharged and managed conservatively as an outpatient.It was discussed with the patient that this may be the early presentation of a more significant problem, and if they notice any of the signs/symptoms in the discharge instruction sheets, or they are otherwise concerned about their medical condition, they should return to the nearest emergency department.       DIAGNOSIS:  Acute right-sided low back pain    MEDICAL DECISION MAKING CODING    COLLECTION AND INTERPRETATION OF DATA  I reviewed prior external notes, including October 1, 2024 internal medicine routine office visit              Critical Care Time  Procedures

## 2024-11-22 ENCOUNTER — TELEPHONE (OUTPATIENT)
Dept: PHYSICAL THERAPY | Facility: OTHER | Age: 49
End: 2024-11-22

## 2024-11-22 NOTE — TELEPHONE ENCOUNTER
"Call placed to the patient per [facility] referral.    Patient did not answer the call. Unable to leave a message \"voicemail has not been set up yet\".    This is the 1st attempt to reach the patient. Will defer referral per protocol.      "

## 2024-11-22 NOTE — DISCHARGE INSTRUCTIONS
Schedule an appointment with [facility].  Return to the emergency department if you have worsening pain, fever, perineal numbness, inability to walk.  Schedule an appointment with your primary care to coordinate your care and follow-up regarding emergency department visit.

## 2024-11-25 ENCOUNTER — NURSE TRIAGE (OUTPATIENT)
Dept: PHYSICAL THERAPY | Facility: OTHER | Age: 49
End: 2024-11-25

## 2024-11-25 DIAGNOSIS — M54.50 ACUTE BILATERAL LOW BACK PAIN, UNSPECIFIED WHETHER SCIATICA PRESENT: Primary | ICD-10-CM

## 2024-11-25 DIAGNOSIS — M54.50 ACUTE EXACERBATION OF CHRONIC LOW BACK PAIN: ICD-10-CM

## 2024-11-25 DIAGNOSIS — G89.29 ACUTE EXACERBATION OF CHRONIC LOW BACK PAIN: ICD-10-CM

## 2024-11-25 NOTE — TELEPHONE ENCOUNTER
Additional Information   Negative: Has the patient had unexplained weight loss?   Negative: Does the patient have a fever?   Negative: Is the patient experiencing urine retention?   Negative: Is the patient experiencing blood in sputum?   Negative: Has the patient experienced major trauma? (fall from height, high speed collision, direct blow to spine) and is also experiencing nausea, light-headedness, or loss of consciousness?   Negative: Is the patient experiencing acute drop foot or paralysis?   Negative: Is this a chronic condition?    Protocols used: [facility] Protocol  Nurse completed the triage and NO RF s/s were present. Referral entered for the [facility] site and the contact/phone number information was given to the patient as well.   Patients information was sent to the preferred site and pt made aware clerical would be calling to schedule the evaluation appointment. Nurse encouraged her to call the site if she does not hear from clerical beforehand. Patient Agreed.  Nurse also offered a call from the  counselor d/t SBT score. Patient declined. Patient was pleasant and appropriate during this encounter.     Patient did not voice any additional questions or concerns at this time.   Patient is aware current/past complaints, relevant dx, additional referrals and treatment/options will be discussed at time of evaluation/consultation appointment.   Patient is in agreement with plan.  Patient very appreciative of CB and referral placement.     Nurse wished her well and the CS referral was closed.

## 2024-11-25 NOTE — TELEPHONE ENCOUNTER
"Additional Information   Negative: Is this related to a work injury?   Negative: Is this related to an MVA?   Negative: Are you currently recieving homecare services?    Background - Initial Assessment  Clinical complaint: ED visit on 11/21 due to Right Lower Back pain that now is also on her left side. Patient states pain started about 2 days prior ED visit. Hx of back pain in the past. This new flare up radiates down into her right buttock and hip no pain down her legs or upper body. No numbness. Tingling sensation in right buttock and down to the right hip. NKI. Not seeing a spine specialist for this pain. Patient states pain is constant \"everyday\". Worse with certain positions especially sitting for long periods of time. Patient described pain as aching, burning, sharp, shooting, dull, throbbing, stabbing. She is taking Robaxin prescribed by ED physician.  Date of onset: 2 days prior ED visit ( new flare up)  Frequency of pain: constant  Quality of pain: aching, burning, dull, sharp, shooting, stabbing, throbbing, and tingling.    Protocols used: [facility] Protocol    "

## 2024-12-02 ENCOUNTER — EVALUATION (OUTPATIENT)
Dept: PHYSICAL THERAPY | Facility: CLINIC | Age: 49
End: 2024-12-02
Payer: COMMERCIAL

## 2024-12-02 DIAGNOSIS — G89.29 ACUTE EXACERBATION OF CHRONIC LOW BACK PAIN: ICD-10-CM

## 2024-12-02 DIAGNOSIS — M54.50 ACUTE BILATERAL LOW BACK PAIN, UNSPECIFIED WHETHER SCIATICA PRESENT: Primary | ICD-10-CM

## 2024-12-02 DIAGNOSIS — M54.50 ACUTE EXACERBATION OF CHRONIC LOW BACK PAIN: ICD-10-CM

## 2024-12-02 PROCEDURE — 97112 NEUROMUSCULAR REEDUCATION: CPT | Performed by: PHYSICAL THERAPIST

## 2024-12-02 PROCEDURE — 97535 SELF CARE MNGMENT TRAINING: CPT | Performed by: PHYSICAL THERAPIST

## 2024-12-02 PROCEDURE — 97161 PT EVAL LOW COMPLEX 20 MIN: CPT | Performed by: PHYSICAL THERAPIST

## 2024-12-02 NOTE — PROGRESS NOTES
PT Evaluation     Today's date: 2024  Patient name: Jill Eubanks  : 1975  MRN: [id number]  Referring provider: Enzo Conde DO  Dx:   Encounter Diagnosis     ICD-10-CM    1. Acute bilateral low back pain, unspecified whether sciatica present  M54.50       2. Acute exacerbation of chronic low back pain  M54.50     G89.29                      Assessment  Impairments: abnormal or restricted ROM, abnormal movement, activity intolerance, impaired physical strength, lacks appropriate home exercise program, pain with function and poor posture     Assessment details: Pt presents with s/s consistent with a L4/5 derangement responding to EIL.  Pt will benefit from PT to address impairments and restore function.    Goals  ST-4 weeks.  1.  Pt will decrease pain > 50%.  2.  Pt will centralize sx.    LT-8 weeks.  1.  Pt will decrease pain > 75%.  2.  Pt will work without pain.    Plan    Planned therapy interventions: manual therapy, neuromuscular re-education, postural training, self care, strengthening, stretching, therapeutic activities, therapeutic exercise, therapeutic training, functional ROM exercises, flexibility, graded activity, graded exercise, home exercise program and body mechanics training    Frequency: 2x week  Duration in weeks: 6        Subjective Evaluation    History of Present Illness  Mechanism of injury: Pt is a 49 yof c/o LBP and B lateral hip pain that began with an insidious onset 6 days ago.    Patient Goals  Patient goals for therapy: decreased edema, increased strength, independence with ADLs/IADLs, return to sport/leisure activities, return to work, decreased pain, improved balance and increased motion    Pain  Current pain ratin  At best pain ratin  At worst pain rating: 10  Quality: sharp, radiating and burning  Relieving factors: medications  Exacerbated by: sitting > 1 hr, bending, twisting, lifting.  Progression: worsening          Objective     Postural  Observations  Seated posture: fair  Standing posture: fair  Correction of posture: has no consistent effect      Neurological Testing     Sensation     Lumbar   Left   Intact: light touch    Right   Intact: light touch    Reflexes   Left   Patellar (L4): brisk (3+)  Achilles (S1): brisk (3+)    Right   Patellar (L4): brisk (3+)  Achilles (S1): brisk (3+)    Active Range of Motion     Lumbar   Flexion: Active lumbar flexion: poor control upon return to neutral.  WFL  Extension:  with pain Restriction level: maximal  Left lateral flexion:  Restriction level: minimal  Right lateral flexion:  Restriction level: minimal  Mechanical Assessment    Cervical      Thoracic      Lumbar    Standing extension: repeated movements  Pain location: no change  Lying extension: repeated movements  Pain location: centralized  Pain intensity: better    Strength/Myotome Testing     Left Knee   Flexion: 4+  Extension: 3+    Right Knee   Flexion: 4+  Extension: 3+    Left Ankle/Foot   Dorsiflexion: 4-  Plantar flexion: 5  Great toe extension: 5    Right Ankle/Foot   Dorsiflexion: 4  Plantar flexion: 5  Great toe extension: 5    Tests     Lumbar     Left   Negative slump test.     Right   Positive slump test.              Dx: L4/5 derangement responding to EIL.    Manuals 12/2                                                                Neuro Re-Ed             Posture correction/ ed 5 min            EIL 4x5            EIS hands of counter  2x10            EIS  1x10            Prone lying 4x1 min            Prone lying in ext                                       Ther Ex                                                                                                                     Ther Activity             Lifting retraining                          Gait Training                                       Modalities

## 2024-12-05 ENCOUNTER — APPOINTMENT (OUTPATIENT)
Dept: PHYSICAL THERAPY | Facility: CLINIC | Age: 49
End: 2024-12-05
Payer: COMMERCIAL

## 2024-12-06 ENCOUNTER — OFFICE VISIT (OUTPATIENT)
Dept: PHYSICAL THERAPY | Facility: CLINIC | Age: 49
End: 2024-12-06
Payer: COMMERCIAL

## 2024-12-06 DIAGNOSIS — G89.29 ACUTE EXACERBATION OF CHRONIC LOW BACK PAIN: ICD-10-CM

## 2024-12-06 DIAGNOSIS — M54.50 ACUTE EXACERBATION OF CHRONIC LOW BACK PAIN: ICD-10-CM

## 2024-12-06 DIAGNOSIS — M54.50 ACUTE BILATERAL LOW BACK PAIN, UNSPECIFIED WHETHER SCIATICA PRESENT: Primary | ICD-10-CM

## 2024-12-06 PROCEDURE — 97140 MANUAL THERAPY 1/> REGIONS: CPT | Performed by: PHYSICAL THERAPIST

## 2024-12-06 PROCEDURE — 97112 NEUROMUSCULAR REEDUCATION: CPT | Performed by: PHYSICAL THERAPIST

## 2024-12-06 NOTE — PROGRESS NOTES
Daily Note     Today's date: 2024  Patient name: Jill Eubanks  : 1975  MRN: [id number]  Referring provider: Enzo Conde DO  Dx:   Encounter Diagnosis     ICD-10-CM    1. Acute bilateral low back pain, unspecified whether sciatica present  M54.50       2. Acute exacerbation of chronic low back pain  M54.50     G89.29                      Subjective: Pt reports 4-5/10 central LBP, with a 30 min tolerance to sitting, 3/10 central LBP after tx.    Objective: See treatment diary below    Assessment: Pt centralized sx and demonstrated improved ROM and sitting tolerance.  EIL and EIS I, B.    Plan: Continue per plan of care.      Dx: L4/5 derangement responding to EIL.    Manuals            P-A Gr III L3/4  2x10                                                  Neuro Re-Ed             Posture correction/ ed 5 min 3 min           EIL 4x5 4x10           EIS hands of counter  2x10            EIS  1x10 2x10           Prone lying 4x1 min 4x1 min           Prone lying in ext  1x1 min                                     Ther Ex                                                                                                                     Ther Activity             Lifting retraining                          Gait Training                                       Modalities

## 2024-12-10 ENCOUNTER — OFFICE VISIT (OUTPATIENT)
Dept: PHYSICAL THERAPY | Facility: CLINIC | Age: 49
End: 2024-12-10
Payer: COMMERCIAL

## 2024-12-10 DIAGNOSIS — M54.50 ACUTE EXACERBATION OF CHRONIC LOW BACK PAIN: ICD-10-CM

## 2024-12-10 DIAGNOSIS — G89.29 ACUTE EXACERBATION OF CHRONIC LOW BACK PAIN: ICD-10-CM

## 2024-12-10 DIAGNOSIS — M54.50 ACUTE BILATERAL LOW BACK PAIN, UNSPECIFIED WHETHER SCIATICA PRESENT: Primary | ICD-10-CM

## 2024-12-10 PROCEDURE — 97140 MANUAL THERAPY 1/> REGIONS: CPT | Performed by: PHYSICAL THERAPIST

## 2024-12-10 PROCEDURE — 97112 NEUROMUSCULAR REEDUCATION: CPT | Performed by: PHYSICAL THERAPIST

## 2024-12-10 NOTE — PROGRESS NOTES
Daily Note     Today's date: 12/10/2024  Patient name: Jill Eubanks  : 1975  MRN: [id number]  Referring provider: Enzo Conde DO  Dx:   Encounter Diagnosis     ICD-10-CM    1. Acute bilateral low back pain, unspecified whether sciatica present  M54.50       2. Acute exacerbation of chronic low back pain  M54.50     G89.29                      Subjective: Pt reports 3-4/10 central LBP, with a 45 min tolerance to sitting, 3/10 central LBP after tx.    Objective: See treatment diary below    Assessment: Pt demonstrated pain with STS without UE assist.  Abolished with P-A mobs, Laser.    Plan: Continue per plan of care.      Dx: L4/5 derangement responding to EIL.    Manuals 12/2 12/6 12/10          P-A Gr III L3/4  2x10 4x10          Laser L3-5   5000J                                    Neuro Re-Ed             Posture correction/ ed 5 min 3 min 2 min          EIL 4x5 4x10 4x10          EIS hands of counter  2x10            EIS  1x10 2x10 2x10          Prone lying 4x1 min 4x1 min 4 x 1 min          Prone lying in ext  1x1 min                                     Ther Ex                                                                                                                     Ther Activity             Lifting retraining   3 min          STS adjustable table   1x10          STS   Foam with UE assist/ 1x10           Gait Training                                       Modalities

## 2024-12-20 ENCOUNTER — OFFICE VISIT (OUTPATIENT)
Dept: PHYSICAL THERAPY | Facility: CLINIC | Age: 49
End: 2024-12-20
Payer: COMMERCIAL

## 2024-12-20 DIAGNOSIS — M54.50 ACUTE BILATERAL LOW BACK PAIN, UNSPECIFIED WHETHER SCIATICA PRESENT: Primary | ICD-10-CM

## 2024-12-20 DIAGNOSIS — G89.29 ACUTE EXACERBATION OF CHRONIC LOW BACK PAIN: ICD-10-CM

## 2024-12-20 DIAGNOSIS — M54.50 ACUTE EXACERBATION OF CHRONIC LOW BACK PAIN: ICD-10-CM

## 2024-12-20 PROCEDURE — 97140 MANUAL THERAPY 1/> REGIONS: CPT | Performed by: PHYSICAL THERAPIST

## 2024-12-20 PROCEDURE — 97530 THERAPEUTIC ACTIVITIES: CPT | Performed by: PHYSICAL THERAPIST

## 2024-12-20 PROCEDURE — 97112 NEUROMUSCULAR REEDUCATION: CPT | Performed by: PHYSICAL THERAPIST

## 2024-12-20 NOTE — PROGRESS NOTES
Daily Note     Today's date: 2024  Patient name: Jill Eubanks  : 1975  MRN: [id number]  Referring provider: Enzo Conde DO  Dx:   Encounter Diagnosis     ICD-10-CM    1. Acute bilateral low back pain, unspecified whether sciatica present  M54.50       2. Acute exacerbation of chronic low back pain  M54.50     G89.29                      Subjective: Pt reports 4/10 central LBP currently, 40 minute sitting tolerance.    Objective: See treatment diary below    Assessment: Pt demonstrated pain with STS without UE assist and limited postural endurance.  Abolished with P-A mobs, Laser.    Plan: Continue per plan of care.      Dx: L4/5 derangement responding to EIL.    Manuals 12/2 12/6 12/10 12/20         P-A Gr III L3/4  2x10 4x10 4x10         Laser L3-5   5000J 5000J                                   Neuro Re-Ed             Posture correction/ ed 5 min 3 min 2 min 5 min         EIL 4x5 4x10 4x10 4x10         EIS hands of counter  2x10            EIS  1x10 2x10 2x10 2x10         Prone lying 4x1 min 4x1 min 4 x 1 min 4x1 min         Prone lying in ext  1x1 min                                     Ther Ex                                                                                                                     Ther Activity             Lifting retraining   3 min 3 min         STS adjustable table   1x10 1x10         STS   Foam with UE assist/ 1x10  Foam with UE assist/ 1x10          Gait Training                                       Modalities

## 2024-12-23 ENCOUNTER — OFFICE VISIT (OUTPATIENT)
Dept: PHYSICAL THERAPY | Facility: CLINIC | Age: 49
End: 2024-12-23
Payer: COMMERCIAL

## 2024-12-23 DIAGNOSIS — M54.50 ACUTE BILATERAL LOW BACK PAIN, UNSPECIFIED WHETHER SCIATICA PRESENT: Primary | ICD-10-CM

## 2024-12-23 DIAGNOSIS — M54.50 ACUTE EXACERBATION OF CHRONIC LOW BACK PAIN: ICD-10-CM

## 2024-12-23 DIAGNOSIS — G89.29 ACUTE EXACERBATION OF CHRONIC LOW BACK PAIN: ICD-10-CM

## 2024-12-23 PROCEDURE — 97112 NEUROMUSCULAR REEDUCATION: CPT

## 2024-12-23 PROCEDURE — 97530 THERAPEUTIC ACTIVITIES: CPT

## 2024-12-23 PROCEDURE — 97140 MANUAL THERAPY 1/> REGIONS: CPT

## 2024-12-26 ENCOUNTER — OFFICE VISIT (OUTPATIENT)
Dept: PHYSICAL THERAPY | Facility: CLINIC | Age: 49
End: 2024-12-26
Payer: COMMERCIAL

## 2024-12-26 DIAGNOSIS — M54.50 ACUTE BILATERAL LOW BACK PAIN, UNSPECIFIED WHETHER SCIATICA PRESENT: Primary | ICD-10-CM

## 2024-12-26 DIAGNOSIS — G89.29 ACUTE EXACERBATION OF CHRONIC LOW BACK PAIN: ICD-10-CM

## 2024-12-26 DIAGNOSIS — M54.50 ACUTE EXACERBATION OF CHRONIC LOW BACK PAIN: ICD-10-CM

## 2024-12-26 PROCEDURE — 97530 THERAPEUTIC ACTIVITIES: CPT

## 2024-12-26 PROCEDURE — 97112 NEUROMUSCULAR REEDUCATION: CPT

## 2024-12-26 PROCEDURE — 97140 MANUAL THERAPY 1/> REGIONS: CPT

## 2024-12-26 NOTE — PROGRESS NOTES
Daily Note     Today's date: 2024  Patient name: Jlil Eubanks  : 1975  MRN: [id number]  Referring provider: Enzo Conde DO  Dx:   Encounter Diagnosis     ICD-10-CM    1. Acute bilateral low back pain, unspecified whether sciatica present  M54.50       2. Acute exacerbation of chronic low back pain  M54.50     G89.29                      Subjective: Pt reports 3/10 central LBP currently. Slightly better today from less sitting at work.     Objective: See treatment diary below    Assessment: Good response to current POC as seen with improvement since last visit.    Plan: Continue per plan of care.      Dx: L4/5 derangement responding to EIL.    Manuals 12/2 12/6 12/10 12/20 12/23 12/26       P-A Gr III L3/4  2x10 4x10 4x10 4x10 4x10       Laser L3-5   5000J 5000J 5000J 5000J                                 Neuro Re-Ed             Posture correction/ ed 5 min 3 min 2 min 5 min 5 min 5 min       EIL 4x5 4x10 4x10 4x10 4x10 4x10       EIS hands of counter  2x10            EIS  1x10 2x10 2x10 2x10 2x10 2x10       Prone lying 4x1 min 4x1 min 4 x 1 min 4x1 min 4x1 min 4x1 min       Prone lying in ext  1x1 min                                     Ther Ex                                                                                                                     Ther Activity             Lifting retraining   3 min 3 min 3 min 3 min       STS adjustable table   1x10 1x10 1x10 1x10       STS   Foam with UE assist/ 1x10  Foam with UE assist/ 1x10  Foam with UE assist/ 1x10         Gait Training                                       Modalities

## 2024-12-31 ENCOUNTER — APPOINTMENT (OUTPATIENT)
Dept: PHYSICAL THERAPY | Facility: CLINIC | Age: 49
End: 2024-12-31
Payer: COMMERCIAL

## 2025-01-02 ENCOUNTER — APPOINTMENT (OUTPATIENT)
Dept: PHYSICAL THERAPY | Facility: CLINIC | Age: 50
End: 2025-01-02
Payer: COMMERCIAL

## 2025-01-07 ENCOUNTER — OFFICE VISIT (OUTPATIENT)
Dept: PHYSICAL THERAPY | Facility: CLINIC | Age: 50
End: 2025-01-07
Payer: COMMERCIAL

## 2025-01-07 DIAGNOSIS — M54.50 ACUTE BILATERAL LOW BACK PAIN, UNSPECIFIED WHETHER SCIATICA PRESENT: Primary | ICD-10-CM

## 2025-01-07 DIAGNOSIS — M54.50 ACUTE EXACERBATION OF CHRONIC LOW BACK PAIN: ICD-10-CM

## 2025-01-07 DIAGNOSIS — G89.29 ACUTE EXACERBATION OF CHRONIC LOW BACK PAIN: ICD-10-CM

## 2025-01-07 PROCEDURE — 97140 MANUAL THERAPY 1/> REGIONS: CPT

## 2025-01-07 PROCEDURE — 97530 THERAPEUTIC ACTIVITIES: CPT

## 2025-01-07 PROCEDURE — 97112 NEUROMUSCULAR REEDUCATION: CPT

## 2025-01-07 NOTE — PROGRESS NOTES
Daily Note     Today's date: 2025  Patient name: Jill Eubanks  : 1975  MRN: [id number]  Referring provider: Enzo Conde DO  Dx:   Encounter Diagnosis     ICD-10-CM    1. Acute bilateral low back pain, unspecified whether sciatica present  M54.50       2. Acute exacerbation of chronic low back pain  M54.50     G89.29                      Subjective: 3/10 pain at rest today. Increased pain from being stuck laying in bed last week when she was sick.     Objective: See treatment diary below    Assessment: Lumbar ext ROM is improving gradually. Minor excessive lumbopelvic movement during STS occasionally. Control improved overall.     Plan: Continue per plan of care.      Dx: L4/5 derangement responding to EIL.    Manuals 12/2 12/6 12/10 12/20 12/23 12/26 1/7      P-A Gr III L3/4  2x10 4x10 4x10 4x10 4x10 4x10      Laser L3-5   5000J 5000J 5000J 5000J 5000                                Neuro Re-Ed             Posture correction/ ed 5 min 3 min 2 min 5 min 5 min 5 min       EIL 4x5 4x10 4x10 4x10 4x10 4x10 4x10      EIS hands of counter  2x10            EIS  1x10 2x10 2x10 2x10 2x10 2x10 2x10      Prone lying 4x1 min 4x1 min 4 x 1 min 4x1 min 4x1 min 4x1 min 4x1 min      Prone lying in ext  1x1 min                                     Ther Ex                                                                                                                     Ther Activity             Lifting retraining   3 min 3 min 3 min 3 min 3 min      STS adjustable table   1x10 1x10 1x10 1x10 2x10      STS   Foam with UE assist/ 1x10  Foam with UE assist/ 1x10  Foam with UE assist/ 1x10         Gait Training                                       Modalities